# Patient Record
Sex: MALE | Race: WHITE
[De-identification: names, ages, dates, MRNs, and addresses within clinical notes are randomized per-mention and may not be internally consistent; named-entity substitution may affect disease eponyms.]

---

## 2023-07-11 ENCOUNTER — HOSPITAL ENCOUNTER (EMERGENCY)
Dept: HOSPITAL 33 - ED | Age: 33
Discharge: HOME | End: 2023-07-11
Payer: COMMERCIAL

## 2023-07-11 VITALS — DIASTOLIC BLOOD PRESSURE: 95 MMHG | SYSTOLIC BLOOD PRESSURE: 148 MMHG

## 2023-07-11 VITALS — HEART RATE: 68 BPM | OXYGEN SATURATION: 97 %

## 2023-07-11 DIAGNOSIS — Z79.891: ICD-10-CM

## 2023-07-11 DIAGNOSIS — R16.0: ICD-10-CM

## 2023-07-11 DIAGNOSIS — K44.9: ICD-10-CM

## 2023-07-11 DIAGNOSIS — R10.9: ICD-10-CM

## 2023-07-11 DIAGNOSIS — N13.2: Primary | ICD-10-CM

## 2023-07-11 LAB
ALBUMIN SERPL-MCNC: 4.3 G/DL (ref 3.5–5)
ALP SERPL-CCNC: 51 U/L (ref 38–126)
ALT SERPL-CCNC: 79 U/L (ref 0–50)
ANION GAP SERPL CALC-SCNC: 14.2 MEQ/L (ref 5–15)
AST SERPL QL: 51 U/L (ref 17–59)
BACTERIA UR CULT: YES
BASOPHILS # BLD AUTO: 0.04 X10^3/UL (ref 0–0.4)
BASOPHILS NFR BLD AUTO: 0.5 % (ref 0–0.4)
BILIRUB BLD-MCNC: 0.6 MG/DL (ref 0.2–1.3)
BUN SERPL-MCNC: 19 MG/DL (ref 9–20)
CALCIUM SPEC-MCNC: 8.9 MG/DL (ref 8.4–10.2)
CHLORIDE SERPL-SCNC: 103 MMOL/L (ref 98–107)
CO2 SERPL-SCNC: 25 MMOL/L (ref 22–30)
CREAT SERPL-MCNC: 1.12 MG/DL (ref 0.66–1.25)
EOSINOPHIL # BLD AUTO: 0.15 X10^3/UL (ref 0–0.5)
GFR SERPLBLD BASED ON 1.73 SQ M-ARVRAT: > 60 ML/MIN
GLUCOSE SERPL-MCNC: 155 MG/DL (ref 74–106)
HCT VFR BLD AUTO: 44.2 % (ref 42–50)
HGB BLD-MCNC: 14.2 G/DL (ref 12.5–18)
IMM GRANULOCYTES # BLD: 0.01 X10^3U/L (ref 0–0.03)
IMM GRANULOCYTES NFR BLD: 0.1 % (ref 0–0.4)
LYMPHOCYTES # SPEC AUTO: 2.69 X10^3/UL (ref 1–4.6)
MCH RBC QN AUTO: 31.6 PG (ref 26–32)
MCHC RBC AUTO-ENTMCNC: 32.1 G/DL (ref 32–36)
MONOCYTES # BLD AUTO: 0.55 X10^3/UL (ref 0–1.3)
NRBC # BLD AUTO: 0 X10^3U/L (ref 0–0.01)
NRBC BLD AUTO-RTO: 0 % (ref 0–0.1)
PLATELET # BLD AUTO: 229 X10^3/UL (ref 150–450)
POTASSIUM SERPLBLD-SCNC: 3.7 MMOL/L (ref 3.5–5.1)
PROT SERPL-MCNC: 7.4 G/DL (ref 6.3–8.2)
PROT UR STRIP-MCNC: 30 MG/DL
RBC # BLD AUTO: 4.5 X10^6/UL (ref 4.1–5.6)
RBC # URNS HPF: >100 /HPF (ref 0–5)
SODIUM SERPL-SCNC: 139 MMOL/L (ref 137–145)
WBC # BLD AUTO: 7.4 X10^3/UL (ref 4–10.5)
WBC URNS QL MICRO: (no result) /HPF (ref 0–5)

## 2023-07-11 PROCEDURE — 96374 THER/PROPH/DIAG INJ IV PUSH: CPT

## 2023-07-11 PROCEDURE — 85025 COMPLETE CBC W/AUTO DIFF WBC: CPT

## 2023-07-11 PROCEDURE — 74176 CT ABD & PELVIS W/O CONTRAST: CPT

## 2023-07-11 PROCEDURE — 81001 URINALYSIS AUTO W/SCOPE: CPT

## 2023-07-11 PROCEDURE — 36415 COLL VENOUS BLD VENIPUNCTURE: CPT

## 2023-07-11 PROCEDURE — 96375 TX/PRO/DX INJ NEW DRUG ADDON: CPT

## 2023-07-11 PROCEDURE — 96376 TX/PRO/DX INJ SAME DRUG ADON: CPT

## 2023-07-11 PROCEDURE — 80053 COMPREHEN METABOLIC PANEL: CPT

## 2023-07-11 PROCEDURE — 99284 EMERGENCY DEPT VISIT MOD MDM: CPT

## 2023-07-11 PROCEDURE — 87086 URINE CULTURE/COLONY COUNT: CPT

## 2023-07-11 PROCEDURE — 36000 PLACE NEEDLE IN VEIN: CPT

## 2023-07-11 NOTE — ERPHSYRPT
- History of Present Illness


Time Seen by Provider: 07/11/23 21:05


Historian: patient


Exam Limitations: no limitations


Patient Subjective Stated Complaint: pt states "I think I have a kidney stone. 

My dad gets them all the time so I know the symptoms."


Triage Nursing Assessment: pt ambulatory to bed by self, pt alert and oriented 

x3, skin pwd, pt c/o R sided flank pain that radiates to R testicle, pain 

started about 1 hr prior to arrival, pt denies vomiting or diarrhea at this 

time.


Physician History: 


Patient is a 33-year-old male presents to our ED for evaluation of right-sided 

flank pain.  Pain started approximately 30 minutes prior to arrival.  Patient 

was sitting at a pool when pain started.  Patient believes he has a kidney 

stone.  Patient states his father gets kidney stones frequently and patient is 

familiar with the symptoms.  Pain tends to radiate down into his right groin 

area.  No trauma.  No fever.  No nausea vomiting or diaphoresis.  Symptoms are 

constant.  Patient denies hematuria.  Patient states is otherwise healthy.  He 

voices no other complaints or concerns at this time.  














Portions of this note were created with voice recognition technology.  There may

be grammatical, spelling, punctuation or sound alike errors





Timing/Duration: today


Activities at Onset: none


Quality: aching


Abdominal Pain Onset Location: flank


Pain Radiation: groin


Severity of Pain-Max: moderate


Severity of Pain-Current: mild


Modifying Factors: Improves With: nothing


Associated Symptoms: denies symptoms


Previous symptoms: no prior history


Allergies/Adverse Reactions: 








No Known Drug Allergies Allergy (Verified 07/11/23 20:50)


   





Hx Tetanus, Diphtheria Vaccination/Date Given:  (unk)


Hx Influenza Vaccination/Date Given: Yes


Hx Pneumococcal Vaccination/Date Given: No


Immunizations Up to Date: Yes





Travel Risk





- International Travel


Have you traveled outside of the country in past 3 weeks: No





- Coronavirus Screening


Are you exhibiting any of the following symptoms?: No


Close contact with a COVID-19 positive Pt in past 14-21 Days: No





- Vaccine Status


Have you recieved a Covid-19 vaccination: Yes


: Pfizer





- Vaccination Dates


Date of 2cond Vaccination (if applicable): 2021





- Review of Systems


Constitutional: No Symptoms, No Fever, No Chills


Eyes: No Symptoms


Ears, Nose, & Throat: No Symptoms


Respiratory: No Symptoms, No Cough, No Dyspnea


Cardiac: No Symptoms, No Chest Pain, No Edema, No Syncope


Abdominal/Gastrointestinal: No Symptoms, No Abdominal Pain, No Nausea, No 

Vomiting, No Diarrhea


Genitourinary Symptoms: No Symptoms, No Dysuria


Musculoskeletal: No Symptoms, No Back Pain, No Neck Pain


Skin: No Symptoms, No Rash


Neurological: No Symptoms, No Dizziness, No Focal Weakness, No Sensory Changes


Psychological: No Symptoms


Endocrine: No Symptoms


Hematologic/Lymphatic: No Symptoms


Immunological/Allergic: No Symptoms


All Other Systems: Reviewed and Negative





- Past Medical History


Pertinent Past Medical History: Yes


Neurological History: Migraines


ENT History: No Pertinent History


Cardiac History: No Pertinent History


Respiratory History: No Pertinent History


Endocrine Medical History: No Pertinent History


Musculoskeletal History: No Pertinent History, Fractures


GI Medical History: No Pertinent History


 History: No Pertinent History


Psycho-Social History: No Pertinent History


Male Reproductive Disorders: No Pertinent History





- Past Surgical History


Past Surgical History: Yes


Neuro Surgical History: No Pertinent History


Cardiac: No Pertinent History


Respiratory: No Pertinent History


Gastrointestinal: No Pertinent History


Genitourinary: No Pertinent History


Musculoskeletal: Other


Male Surgical History: No Pertinent History


Other Surgical History: had r wrist set in surgery at age of 4. also had broken 

left and it was casted.





- Social History


Smoking Status: Former smoker


Exposure to second hand smoke: No


Drug Use: none


Patient Lives Alone: No





- Nursing Vital Signs


Nursing Vital Signs: 


                               Initial Vital Signs











Temperature  97.6 F   07/11/23 20:51


 


Pulse Rate  72   07/11/23 20:51


 


Respiratory Rate  18   07/11/23 20:51


 


Blood Pressure  147/98   07/11/23 20:51


 


O2 Sat by Pulse Oximetry  96   07/11/23 20:51








                                   Pain Scale











Pain Intensity                 6

















- Physical Exam


General Appearance: no apparent distress, alert


Eye Exam: PERRL/EOMI, eyes nml inspection


Ears, Nose, Throat Exam: normal ENT inspection, pharynx normal, moist mucous 

membranes


Neck Exam: normal inspection, non-tender, supple, full range of motion


Respiratory Exam: normal breath sounds, lungs clear, No respiratory distress


Cardiovascular Exam: regular rate/rhythm, normal heart sounds


Gastrointestinal/Abdomen Exam: soft, other (Tenderness to palpation of right f

lank.  Mild right CVA.), No tenderness, No mass


Back Exam: normal inspection, normal range of motion, No CVA tenderness, No 

vertebral tenderness


Extremity Exam: normal inspection, normal range of motion, pelvis stable


Neurologic Exam: alert, oriented x 3, cooperative, normal mood/affect, nml 

cerebellar function, sensation nml, No motor deficits


Skin Exam: normal color, warm, dry


Lymphatic Exam: No adenopathy


**SpO2 Interpretation**: normal


SpO2: 96


O2 Delivery: Room Air





- Course


Nursing assessment & vital signs reviewed: Yes





- CT Exams


  ** Abdomen/Pelvis


CT Interpretation: Tele-radiologist Report (Mall hiatal hernia, 5 to 6 mm 

proximal right ureteral calculus at the level of L3, hydronephrosis, 2 right and

3 left nephrolithiasis, hepatomegaly)


Ordered Tests: 


                               Active Orders 24 hr











 Category Date Time Status


 


 IV Insertion STAT Care  07/11/23 21:00 Active


 


 ABDOMEN AND PELVIS W/0 CONTRAS [CT] Stat Exams  07/11/23 21:02 Completed


 


 CBC W DIFF Stat Lab  07/11/23 21:10 Completed


 


 CMP Stat Lab  07/11/23 21:10 Completed


 


 UA W/RFX UR CULTURE Stat Lab  07/11/23 21:10 Ordered








Medication Summary














Discontinued Medications














Generic Name Dose Route Start Last Admin





  Trade Name Danielq  PRN Reason Stop Dose Admin


 


Sodium Chloride  1,000 mls @ 999 mls/hr  07/11/23 21:00  07/11/23 22:08





  Sodium Chloride 0.9% 1000 Ml  IV  07/11/23 22:00  Infused





  .Q1H1M STA   Infusion


 


Sodium Chloride  Confirm  07/11/23 21:06 





  Sodium Chloride 0.9% 1000 Ml  Administered  07/11/23 21:07 





  Dose  





  1,000 mls @ ud  





  .ROUTE  





  .STK-MED ONE  


 


Ketorolac Tromethamine  30 mg  07/11/23 21:00  07/11/23 21:07





  Ketorolac Tromethamine 30 Mg/Ml Inj  IV  07/11/23 21:01  30 mg





  STAT ONE   Administration


 


Ketorolac Tromethamine  Confirm  07/11/23 21:06 





  Ketorolac Tromethamine 30 Mg/Ml Inj  Administered  07/11/23 21:07 





  Dose  





  30 mg  





  .ROUTE  





  .STK-MED ONE  


 


Ketorolac Tromethamine  30 mg  07/11/23 22:06  07/11/23 22:07





  Ketorolac Tromethamine 30 Mg/Ml Inj  IV  07/11/23 22:07  30 mg





  STAT ONE   Administration


 


Ketorolac Tromethamine  Confirm  07/11/23 22:06 





  Ketorolac Tromethamine 30 Mg/Ml Inj  Administered  07/11/23 22:07 





  Dose  





  30 mg  





  .ROUTE  





  .K-MED ONE  











Lab/Rad Data: 


                           Laboratory Result Diagrams





                                 07/11/23 21:10 





                                 07/11/23 21:10 





                               Laboratory Results











  07/11/23 07/11/23 Range/Units





  21:10 21:10 


 


WBC   7.4  (4.0-10.5)  x10^3/uL


 


RBC   4.50  (4.1-5.6)  x10^6/uL


 


Hgb   14.2  (12.5-18.0)  g/dL


 


Hct   44.2  (42-50)  %


 


MCV   98.2  ()  fL


 


MCH   31.6  (26-32)  pg


 


MCHC   32.1  (32-36)  g/dL


 


RDW   12.6  (11.5-14.0)  %


 


Plt Count   229  (150-450)  x10^3/uL


 


MPV   11.1 H  (7.5-11.0)  fL


 


Gran %   53.6  (36.0-66.0)  %


 


Immature Gran % (Auto)   0.1  (0.00-0.4)  %


 


Nucleat RBC Rel Count   0.0  (0.00-0.1)  %


 


Eos # (Auto)   0.15  (0-0.5)  x10^3/uL


 


Immature Gran # (Auto)   0.01  (0.00-0.03)  x10^3u/L


 


Absolute Lymphs (auto)   2.69  (1.0-4.6)  x10^3/uL


 


Absolute Monos (auto)   0.55  (0.0-1.3)  x10^3/uL


 


Absolute Nucleated RBC   0.00  (0.00-0.01)  x10^3u/L


 


Lymphocytes %   36.4  (24.0-44.0)  %


 


Monocytes %   7.4  (0.0-12.0)  %


 


Eosinophils %   2.0  (0.00-5.0)  %


 


Basophils %   0.5  (0.0-0.4)  %


 


Absolute Granulocytes   3.95  (1.4-6.9)  x10^3/uL


 


Basophils #   0.04  (0-0.4)  x10^3/uL


 


Sodium  139   (137-145)  mmol/L


 


Potassium  3.7   (3.5-5.1)  mmol/L


 


Chloride  103   ()  mmol/L


 


Carbon Dioxide  25   (22-30)  mmol/L


 


Anion Gap  14.2   (5-15)  MEQ/L


 


BUN  19   (9-20)  mg/dL


 


Creatinine  1.12   (0.66-1.25)  mg/dL


 


Estimated GFR  > 60.0   ML/MIN


 


Glucose  155 H   ()  mg/dL


 


Calcium  8.9   (8.4-10.2)  mg/dL


 


Total Bilirubin  0.60   (0.2-1.3)  mg/dL


 


AST  51   (17-59)  U/L


 


ALT  79 H   (0-50)  U/L


 


Alkaline Phosphatase  51   ()  U/L


 


Serum Total Protein  7.4   (6.3-8.2)  g/dL


 


Albumin  4.3   (3.5-5.0)  g/dL














- Progress


Progress: improved


Progress Note: 


Patient 33-year-old male presents to our ED with acute onset flank pain.  

Physical exam reveals mild right CVA.  CT abdomen pelvis reveals a obstructive 

uropathy measuring 5 to 6 mm.  Hydronephrosis observed.  CBC CMP within normal 

limits.  Urinalysis pending.  Patient received Flomax in our ED.  Patient 

received Toradol 30 mg x 2.  Normal saline infused.  Patient discharged home 

with 4 tabs of hydrocodone to be used overnight as needed for pain.  Patient 

received Toradol for pain control.  A prescription for Toradol was forwarded to 

patient's pharmacy.  Patient received a referral to urology.  Patient also 

received a urine strainer.  Patient reassessed.  He is comfortable.  UA pending.

  We will discharge patient home pending urinalysis results.  Plan of care 

discussed with patient.  He agrees to follow-up with her primary care 

doctor/urologist within 48 hours for reevaluation.








Portions of this note were created with voice recognition technology.  There may

 be grammatical, spelling, punctuation or sound alike errors


07/11/23 22:30





Complexity of problem addressed is moderate acute complicated














No critical care time








Complexity of data reviewed and analyzed is moderate.  Labs and imaging study 

ordered.  Findings correlated with clinical exam.  Diagnosis established based 

on imaging study and physical exam.





Risk of complication and or risk morbidity/mortality patient management is high.

  Patient received IV controlled medication/morphine for pain control.  Patient 

will be discharged home.





Patient will follow-up with urology as advised.  Time to discharge patient is 15

 minutes.  Plan of care established for shared decision making.  No social 

determinants of health present to impede follow-up.  Patient voices no other 

complaints or concerns at this time.








Portions of this note were created with voice recognition technology.  There may

 be grammatical, spelling, punctuation or sound alike errors

















07/11/23 22:33





Counseled pt/family regarding: lab results, diagnosis, need for follow-up, rad 

results





- Departure


Departure Disposition: Home


Clinical Impression: 


 Small hiatal hernia, Ureterolithiasis, Hydronephrosis, Nephrolithiasis, 

Hepatomegaly





Condition: Stable


Critical Care Time: No


Referrals: 


JOSEPH JOHNSON MD [Primary Care Provider] - Follow up/PCP as directed


GEO EVANS [COURTESY STAFF] - Follow up/PCP as directed


Additional Instructions: 


Discharge/Care Plan





VIK RUTHERFORD was seen on 07/11/23 in the Emergency Room. The patient was 

counseled regarding Diagnosis,Lab results, Imaging studies, need for follow up 

and when to return to the Emergency Room.





Prescriptions given:





Discharge Note





I have spoken with the patient and/or caregivers. I have explained the patient's

condition, diagnosis and treatment plan based on the information available to me

at this time. I have answered the patient's and/or caregiver's questions and 

addressed any concerns. The patient and/or caregivers have as good understanding

of the patient's diagnosis, condition and treatment plan as can be expected at 

this point. The vital signs have been stable. The patient's condition is stable 

and appropriate for discharge from the emergency department.





The patient will pursue further outpatient evaluation with the primary care 

physician or other designated or consulting physician as outlined in the 

discharge instructions. The patient and/or caregivers are agreeable to this plan

of care and follow-up instructions have been explained in detail. The patient 

and/or caregivers have received these instruction. The patient/and or caregivers

are aware that any significant change in condition or worsening of symptoms 

should prompt an immediate return to this or the closest emergency department or

call 911. 








Prescriptions: 


Hydrocodone/APAP 5/325 [Norco 5/325 mg***] 1 each PO Q6H PRN PRN #10 tablet MDD 

4


 PRN Reason: Pain


Tamsulosin HCl 0.4 mg*** [Flomax 0.4 MG***] 0.4 mg PO DAILY 14 Days #14 cap


Ketorolac Trometh 10 mg Tab** [TORAdol 10 MG TABLET***] 10 mg PO TID 5 Days #15 

tablet

## 2023-07-11 NOTE — XRAY
Indication: Right flank pain.  Dysuria.



Multiple contiguous axial images obtained through the abdomen and pelvis

without contrast using renal stone protocol.



Comparison: None.



Lung bases clear.  Heart not enlarged.  Small hiatal hernia.



5-6 mm proximal right ureter calculus, approximately L3 level.  Proximal right

ureter is prominent along with mild hydronephrosis consistent with partial

obstructive uropathy.  Additional 2 right and 3 left renal micro-calculi.



Stomach is distended with food/fluid.  Noncontrasted stomach and bowel loops

appear nonobstructed with normal appendix.  19.9 cm fatty hepatomegaly.



Remaining liver, gallbladder, pancreas, spleen, adrenal glands, kidneys,

ureters, bladder, and aorta are unremarkable for noncontrast exam.



Osseous structures intact.



Impression:

1.  5-6 mm proximal right ureter calculus producing partial obstruction.

Additional bilateral renal micro-calculi.

2.  Incidental small hiatal hernia and fatty hepatomegaly.